# Patient Record
Sex: MALE | Race: WHITE | NOT HISPANIC OR LATINO | Employment: FULL TIME | URBAN - METROPOLITAN AREA
[De-identification: names, ages, dates, MRNs, and addresses within clinical notes are randomized per-mention and may not be internally consistent; named-entity substitution may affect disease eponyms.]

---

## 2018-09-28 ENCOUNTER — OFFICE VISIT (OUTPATIENT)
Dept: URGENT CARE | Facility: CLINIC | Age: 23
End: 2018-09-28
Payer: MEDICAID

## 2018-09-28 VITALS
WEIGHT: 170 LBS | RESPIRATION RATE: 18 BRPM | HEIGHT: 67 IN | OXYGEN SATURATION: 98 % | BODY MASS INDEX: 26.68 KG/M2 | TEMPERATURE: 98 F

## 2018-09-28 DIAGNOSIS — S62.635A CLOSED DISPLACED FRACTURE OF DISTAL PHALANX OF LEFT RING FINGER, INITIAL ENCOUNTER: Primary | ICD-10-CM

## 2018-09-28 DIAGNOSIS — S69.92XA INJURY OF LEFT RING FINGER, INITIAL ENCOUNTER: ICD-10-CM

## 2018-09-28 PROCEDURE — 99203 OFFICE O/P NEW LOW 30 MIN: CPT | Mod: S$GLB,,, | Performed by: PHYSICIAN ASSISTANT

## 2018-09-28 PROCEDURE — 73140 X-RAY EXAM OF FINGER(S): CPT | Mod: FY,LT,S$GLB, | Performed by: RADIOLOGY

## 2018-09-28 NOTE — PATIENT INSTRUCTIONS
Finger Fracture, Closed  You have a broken finger (fracture). This causes local pain, swelling, and bruising. This injury usually takes about 4 to 6 weeks to heal, but can take longer in some cases. Finger injuries are often treated with a splint or cast, or by taping the injured finger to the next one (garrett taping). This protects the injured finger and holds the bone in position while it heals. More serious fractures may need surgery.     If the fingernail has been severely injured, it will probably fall off in 1 to 2 weeks. A new fingernail will usually start to grow back within a month.  Home care  Follow these guidelines when caring for yourself at home:  · Keep your hand elevated to reduce pain and swelling. When sitting or lying down keep your arm above the level of your heart. You can do this by placing your arm on a pillow that rests on your chest or on a pillow at your side. This is most important during the first 2 days (48 hours) after the injury.  · Put an ice pack on the injured area. Do this for 20 minutes every 1 to 2 hours the first day for pain relief. You can make an ice pack by wrapping a plastic bag of ice cubes in a thin towel. As the ice melts, be careful that the cast or splint doesnt get wet. Continue using the ice pack 3 to 4 times a day until the pain and swelling go away.  · Keep the cast or splint completely dry at all times. Bathe with your cast or splint out of the water. Protect it with a large plastic bag, rubber-banded at the top end. If a fiberglass cast or splint gets wet, you can dry it with a hair dryer.  · If garrett tape was put on and it becomes wet or dirty, change it. You may replace it with paper, plastic, or cloth tape. Cloth tape and paper tapes must be kept dry. Keep the garrett tape in place for at least 4 weeks.  · You may use acetaminophen or ibuprofen to control pain, unless another pain medicine was prescribed. If you have chronic liver or kidney disease, talk with  your healthcare provider before using these medicines. Also talk with your provider if youve had a stomach ulcer or gastrointestinal bleeding.  · Dont put creams or objects under the cast if you have itching.  Follow-up care  Follow up with your healthcare provider, or as advised. This is to make sure the bone is healing the way it should.  X-rays may be taken. You will be told of any new findings that may affect your care.  When to seek medical advice  Call your healthcare provider right away if any of these occur:  · The plaster cast or splint becomes wet or soft  · The cast or splint cracks  · The fiberglass cast or splint stays wet for more than 24 hours  · Pain or swelling gets worse  · Redness, warmth, swelling, drainage from the wound, or foul odor from a cast or splint  · Finger becomes more cold, blue, numb, or tingly  · You cant move your finger  · The skin around the cast or splint becomes red  · Fever of 100.4ºF (38ºC) or higher, or as directed by your healthcare provider  Date Last Reviewed: 2/1/2017 © 2000-2017 [employer]. [address] [place] [address]. All rights reserved. This information is not intended as a substitute for professional medical care. Always follow your healthcare professional's instructions.        Mallet Finger  You have an injury to your finger causing the tip of your finger to droop down. This makes your finger look like a small hammer or mallet. This is why its given this name. It is also called baseball finger. This injury happens when the tendon that holds the finger straight at the last joint tears. Sometimes a small break happens where this tendon attaches to the bone.  This causes local pain, swelling, and bruising. This injury usually takes about 4 to 6 weeks to heal. This injury is often treated with a special finger splint called a stack splint. It holds the tendon in the correct position. But even with right treatment, it may not be  possible to fully straighten that joint after the injury heals. After healing, the joint will be stiff but usually recovers flexibility over time.  Home care  · Keep your arm elevated to reduce pain and swelling. When sitting or lying down elevate your arm above the level of your heart. You can do this by placing your arm on a pillow that rests on your chest or on a pillow at your side. This is most important during the first 48 hours after the injury.  · Put an ice pack over the injured area for 15 to 20 minutes every 3 to 6 hours. You should do this for the first 24 to 48 hours. You can make an ice pack by filling a plastic bag that seals at the top with ice cubes and then wrapping it with a thin towel. Be careful not to injure your skin with the ice treatments. Ice should never be applied directly to skin. Continue the use of ice packs for relief of pain and swelling as needed. After 48 hours, apply heat (warm shower or warm bath) for 15 to 20 minutes several times a day, or alternate ice and heat.  · If a splint was applied, keep it in place for the time advised. If you remove it too soon, it will cause the position of the tendon to change and the finger joint will heal in a bent position.  · You may use over-the-counter pain medicine to control pain, unless another pain medicine was prescribed.Talk with your healthcare provider before using these medicines if you have chronic liver or kidney disease or ever had a stomach ulcer or GI bleeding.  · Most patients can return to normal activity while wearing the splint. Discuss any limitations with your healthcare provider.  Follow-up care  Follow up with your healthcare provider, or as advised.  If X-rays were taken, you will be told of any new findings that may affect your care.   When to seek medical advice  Call your healthcare provider right away if any of the following occur:  · Pain or swelling in the injured finger increases  · The finger becomes red or  warm   · Injured finger becomes cold, blue, numb, or tingly  Date Last Reviewed: 11/23/2015  © 6170-3184 "[employer]. [address] [place] [address], [place] [address]. All rights reserved. This information is not intended as a substitute for professional medical care. Always follow your healthcare professional's instructions.      Please follow up with your Primary care provider within 2-5 days if your signs and symptoms have not resolved or worsen.     If your condition worsens or fails to improve we recommend that you receive another evaluation at the emergency room immediately or contact your primary medical clinic to discuss your concerns.   You must understand that you have received an Urgent Care treatment only and that you may be released before all of your medical problems are known or treated. You, the patient, will arrange for follow up care as instructed.     RED FLAGS/WARNING SYMPTOMS DISCUSSED WITH PATIENT THAT WOULD WARRANT EMERGENT MEDICAL ATTENTION. PATIENT VERBALIZED UNDERSTANDING.

## 2018-09-28 NOTE — PROGRESS NOTES
"Subjective:       Patient ID: Byron Garcia is a 23 y.o. male.    Vitals:  height is 5' 7" (1.702 m) and weight is 77.1 kg (170 lb). His temperature is 98.2 °F (36.8 °C). His respiration is 18 and oxygen saturation is 98%.     Chief Complaint: Hand Pain    Hand Pain    The incident occurred 5 to 7 days ago. The incident occurred at school. The injury mechanism was a direct blow. The pain is present in the left fingers. The pain is at a severity of 5/10. The pain is mild. The pain has been constant since the incident. Pertinent negatives include no chest pain or numbness. Nothing aggravates the symptoms.     Review of Systems   Constitution: Negative for weakness and malaise/fatigue.   HENT: Negative for nosebleeds.    Cardiovascular: Negative for chest pain and syncope.   Respiratory: Negative for shortness of breath.    Musculoskeletal: Positive for joint pain and joint swelling. Negative for back pain and neck pain.   Gastrointestinal: Negative for abdominal pain.   Genitourinary: Negative for hematuria.   Neurological: Negative for dizziness and numbness.       Objective:      Physical Exam   Constitutional: He is oriented to person, place, and time. He appears well-developed and well-nourished. He is cooperative.  Non-toxic appearance. He does not appear ill. No distress.   HENT:   Head: Normocephalic and atraumatic.   Right Ear: Hearing, tympanic membrane, external ear and ear canal normal.   Left Ear: Hearing, tympanic membrane, external ear and ear canal normal.   Nose: Nose normal. No mucosal edema, rhinorrhea or nasal deformity. No epistaxis. Right sinus exhibits no maxillary sinus tenderness and no frontal sinus tenderness. Left sinus exhibits no maxillary sinus tenderness and no frontal sinus tenderness.   Mouth/Throat: Uvula is midline, oropharynx is clear and moist and mucous membranes are normal. No trismus in the jaw. Normal dentition. No uvula swelling. No posterior oropharyngeal erythema.   Eyes: " Conjunctivae and lids are normal. Right eye exhibits no discharge. Left eye exhibits no discharge. No scleral icterus.   Sclera clear bilat   Neck: Trachea normal, normal range of motion, full passive range of motion without pain and phonation normal. Neck supple.   Cardiovascular: Normal rate, regular rhythm, normal heart sounds, intact distal pulses and normal pulses.   Pulmonary/Chest: Effort normal and breath sounds normal. No respiratory distress.   Abdominal: Soft. Normal appearance and bowel sounds are normal. He exhibits no distension, no pulsatile midline mass and no mass. There is no tenderness.   Musculoskeletal: He exhibits no edema or deformity.        Left hand: He exhibits decreased range of motion, tenderness, bony tenderness and swelling. He exhibits normal two-point discrimination, normal capillary refill, no deformity and no laceration. Normal sensation noted. Decreased sensation is not present in the ulnar distribution, is not present in the medial redistribution and is not present in the radial distribution. Normal strength noted. He exhibits no finger abduction, no thumb/finger opposition and no wrist extension trouble.        Hands:  Slight decrease in extension DIP of left 4th phalanx   Neurological: He is alert and oriented to person, place, and time. He exhibits normal muscle tone. Coordination normal.   Skin: Skin is warm, dry and intact. He is not diaphoretic. No pallor.   Psychiatric: He has a normal mood and affect. His speech is normal and behavior is normal. Judgment and thought content normal. Cognition and memory are normal.   Nursing note and vitals reviewed.        XRAY: closed avulsion fracture proximal distal phalanx of the 4th phalanx of the left hand    EXTENSION SPLINT PLACED. NVIT DISTALLY WITH SILT AND 2+BCR POST SPLINT PLACEMENT     Assessment:       1. Closed displaced fracture of distal phalanx of left ring finger, initial encounter    2. Injury of left ring finger,  initial encounter        Plan:         Closed displaced fracture of distal phalanx of left ring finger, initial encounter  -     Ambulatory referral to Orthopedics    Injury of left ring finger, initial encounter  -     X-Ray Finger 2 or More Views; Future; Expected date: 09/28/2018          Finger Fracture, Closed  You have a broken finger (fracture). This causes local pain, swelling, and bruising. This injury usually takes about 4 to 6 weeks to heal, but can take longer in some cases. Finger injuries are often treated with a splint or cast, or by taping the injured finger to the next one (garrett taping). This protects the injured finger and holds the bone in position while it heals. More serious fractures may need surgery.     If the fingernail has been severely injured, it will probably fall off in 1 to 2 weeks. A new fingernail will usually start to grow back within a month.  Home care  Follow these guidelines when caring for yourself at home:  · Keep your hand elevated to reduce pain and swelling. When sitting or lying down keep your arm above the level of your heart. You can do this by placing your arm on a pillow that rests on your chest or on a pillow at your side. This is most important during the first 2 days (48 hours) after the injury.  · Put an ice pack on the injured area. Do this for 20 minutes every 1 to 2 hours the first day for pain relief. You can make an ice pack by wrapping a plastic bag of ice cubes in a thin towel. As the ice melts, be careful that the cast or splint doesnt get wet. Continue using the ice pack 3 to 4 times a day until the pain and swelling go away.  · Keep the cast or splint completely dry at all times. Bathe with your cast or splint out of the water. Protect it with a large plastic bag, rubber-banded at the top end. If a fiberglass cast or splint gets wet, you can dry it with a hair dryer.  · If garrett tape was put on and it becomes wet or dirty, change it. You may replace it  with paper, plastic, or cloth tape. Cloth tape and paper tapes must be kept dry. Keep the garrett tape in place for at least 4 weeks.  · You may use acetaminophen or ibuprofen to control pain, unless another pain medicine was prescribed. If you have chronic liver or kidney disease, talk with your healthcare provider before using these medicines. Also talk with your provider if youve had a stomach ulcer or gastrointestinal bleeding.  · Dont put creams or objects under the cast if you have itching.  Follow-up care  Follow up with your healthcare provider, or as advised. This is to make sure the bone is healing the way it should.  X-rays may be taken. You will be told of any new findings that may affect your care.  When to seek medical advice  Call your healthcare provider right away if any of these occur:  · The plaster cast or splint becomes wet or soft  · The cast or splint cracks  · The fiberglass cast or splint stays wet for more than 24 hours  · Pain or swelling gets worse  · Redness, warmth, swelling, drainage from the wound, or foul odor from a cast or splint  · Finger becomes more cold, blue, numb, or tingly  · You cant move your finger  · The skin around the cast or splint becomes red  · Fever of 100.4ºF (38ºC) or higher, or as directed by your healthcare provider  Date Last Reviewed: 2/1/2017 © 2000-2017 [employer]. [address] [place] [address]. All rights reserved. This information is not intended as a substitute for professional medical care. Always follow your healthcare professional's instructions.        Mallet Finger  You have an injury to your finger causing the tip of your finger to droop down. This makes your finger look like a small hammer or mallet. This is why its given this name. It is also called baseball finger. This injury happens when the tendon that holds the finger straight at the last joint tears. Sometimes a small break happens where this tendon attaches to  the bone.  This causes local pain, swelling, and bruising. This injury usually takes about 4 to 6 weeks to heal. This injury is often treated with a special finger splint called a stack splint. It holds the tendon in the correct position. But even with right treatment, it may not be possible to fully straighten that joint after the injury heals. After healing, the joint will be stiff but usually recovers flexibility over time.  Home care  · Keep your arm elevated to reduce pain and swelling. When sitting or lying down elevate your arm above the level of your heart. You can do this by placing your arm on a pillow that rests on your chest or on a pillow at your side. This is most important during the first 48 hours after the injury.  · Put an ice pack over the injured area for 15 to 20 minutes every 3 to 6 hours. You should do this for the first 24 to 48 hours. You can make an ice pack by filling a plastic bag that seals at the top with ice cubes and then wrapping it with a thin towel. Be careful not to injure your skin with the ice treatments. Ice should never be applied directly to skin. Continue the use of ice packs for relief of pain and swelling as needed. After 48 hours, apply heat (warm shower or warm bath) for 15 to 20 minutes several times a day, or alternate ice and heat.  · If a splint was applied, keep it in place for the time advised. If you remove it too soon, it will cause the position of the tendon to change and the finger joint will heal in a bent position.  · You may use over-the-counter pain medicine to control pain, unless another pain medicine was prescribed.Talk with your healthcare provider before using these medicines if you have chronic liver or kidney disease or ever had a stomach ulcer or GI bleeding.  · Most patients can return to normal activity while wearing the splint. Discuss any limitations with your healthcare provider.  Follow-up care  Follow up with your healthcare provider, or as  advised.  If X-rays were taken, you will be told of any new findings that may affect your care.   When to seek medical advice  Call your healthcare provider right away if any of the following occur:  · Pain or swelling in the injured finger increases  · The finger becomes red or warm   · Injured finger becomes cold, blue, numb, or tingly  Date Last Reviewed: 11/23/2015  © 6366-4014 [employer]. [address] [place] [address], [place] [address]. All rights reserved. This information is not intended as a substitute for professional medical care. Always follow your healthcare professional's instructions.      Please follow up with your Primary care provider within 2-5 days if your signs and symptoms have not resolved or worsen.     If your condition worsens or fails to improve we recommend that you receive another evaluation at the emergency room immediately or contact your primary medical clinic to discuss your concerns.   You must understand that you have received an Urgent Care treatment only and that you may be released before all of your medical problems are known or treated. You, the patient, will arrange for follow up care as instructed.     RED FLAGS/WARNING SYMPTOMS DISCUSSED WITH PATIENT THAT WOULD WARRANT EMERGENT MEDICAL ATTENTION. PATIENT VERBALIZED UNDERSTANDING.

## 2022-06-14 ENCOUNTER — OFFICE VISIT (OUTPATIENT)
Dept: INTERNAL MEDICINE | Facility: CLINIC | Age: 27
End: 2022-06-14
Payer: COMMERCIAL

## 2022-06-14 ENCOUNTER — HOSPITAL ENCOUNTER (OUTPATIENT)
Dept: RADIOLOGY | Facility: OTHER | Age: 27
Discharge: HOME | End: 2022-06-14
Attending: STUDENT IN AN ORGANIZED HEALTH CARE EDUCATION/TRAINING PROGRAM
Payer: COMMERCIAL

## 2022-06-14 VITALS
SYSTOLIC BLOOD PRESSURE: 128 MMHG | WEIGHT: 173.63 LBS | BODY MASS INDEX: 27.19 KG/M2 | HEART RATE: 82 BPM | DIASTOLIC BLOOD PRESSURE: 77 MMHG | OXYGEN SATURATION: 100 %

## 2022-06-14 DIAGNOSIS — S93.402A SPRAIN OF LEFT ANKLE, UNSPECIFIED LIGAMENT, INITIAL ENCOUNTER: Primary | ICD-10-CM

## 2022-06-14 DIAGNOSIS — S93.402A SPRAIN OF LEFT ANKLE, UNSPECIFIED LIGAMENT, INITIAL ENCOUNTER: ICD-10-CM

## 2022-06-14 PROCEDURE — 99203 PR OFFICE/OUTPT VISIT, NEW, LEVL III, 30-44 MIN: ICD-10-PCS | Mod: S$GLB,,, | Performed by: STUDENT IN AN ORGANIZED HEALTH CARE EDUCATION/TRAINING PROGRAM

## 2022-06-14 PROCEDURE — 73610 X-RAY EXAM OF ANKLE: CPT | Mod: 26,LT,, | Performed by: RADIOLOGY

## 2022-06-14 PROCEDURE — 99999 PR PBB SHADOW E&M-NEW PATIENT-LVL III: CPT | Mod: PBBFAC,,, | Performed by: STUDENT IN AN ORGANIZED HEALTH CARE EDUCATION/TRAINING PROGRAM

## 2022-06-14 PROCEDURE — 73610 XR ANKLE COMPLETE 3 VIEW LEFT: ICD-10-PCS | Mod: 26,LT,, | Performed by: RADIOLOGY

## 2022-06-14 PROCEDURE — 99203 OFFICE O/P NEW LOW 30 MIN: CPT | Mod: PBBFAC | Performed by: STUDENT IN AN ORGANIZED HEALTH CARE EDUCATION/TRAINING PROGRAM

## 2022-06-14 PROCEDURE — 99203 OFFICE O/P NEW LOW 30 MIN: CPT | Mod: S$GLB,,, | Performed by: STUDENT IN AN ORGANIZED HEALTH CARE EDUCATION/TRAINING PROGRAM

## 2022-06-14 PROCEDURE — 99999 PR PBB SHADOW E&M-NEW PATIENT-LVL III: ICD-10-PCS | Mod: PBBFAC,,, | Performed by: STUDENT IN AN ORGANIZED HEALTH CARE EDUCATION/TRAINING PROGRAM

## 2022-06-14 PROCEDURE — 73610 X-RAY EXAM OF ANKLE: CPT | Mod: TC,FY,LT

## 2022-06-14 RX ORDER — IBUPROFEN 600 MG/1
600 TABLET ORAL EVERY 6 HOURS PRN
Qty: 30 TABLET | Refills: 0 | Status: SHIPPED | OUTPATIENT
Start: 2022-06-14 | End: 2022-06-28

## 2022-06-14 NOTE — PROGRESS NOTES
Subjective:       Patient ID: Byron Garcia is a 27 y.o. male.    Chief Complaint: Sprain of left ankle, unspecified ligament, initial encounter [O38.217A]    Patient is new to me, here to establish care.    Ankle injury -   Injured left ankle while playing basketball   Stepped on another player's foot and rolled ankle last Tuesday  Continued playing, painful but still able to bear weight  Mild swelling and bruising initially   Got an ankle brace yesterday, improvement in pain when wearing  Has been icing intermittently   History of plantar fascitis in that foot, no other prior injuries     Review of Systems   Musculoskeletal: Positive for arthralgias, gait problem, joint swelling and myalgias.         No current outpatient medications  Objective:      Vitals:    06/14/22 0946   BP: 128/77   Pulse: 82   SpO2: 100%   Weight: 78.8 kg (173 lb 9.8 oz)   PainSc:   6     Body mass index is 27.19 kg/m².    Physical Exam  Vitals and nursing note reviewed.   Constitutional:       General: He is not in acute distress.     Appearance: He is not ill-appearing or diaphoretic.   HENT:      Head: Normocephalic and atraumatic.   Eyes:      Conjunctiva/sclera: Conjunctivae normal.   Cardiovascular:      Rate and Rhythm: Normal rate and regular rhythm.      Heart sounds: Normal heart sounds. No murmur heard.    No friction rub. No gallop.   Pulmonary:      Effort: Pulmonary effort is normal. No respiratory distress.      Breath sounds: Normal breath sounds. No stridor. No wheezing, rhonchi or rales.   Musculoskeletal:      Left ankle: Swelling (trace edema anterior lateral malleolus) present. No deformity or ecchymosis. Tenderness present over the ATF ligament. No lateral malleolus, medial malleolus, CF ligament, posterior TF ligament, base of 5th metatarsal or proximal fibula tenderness. Normal range of motion. Normal pulse.      Left Achilles Tendon: Normal. No defects.      Left foot: Normal range of motion. No swelling, deformity,  tenderness or bony tenderness. Normal pulse.      Comments:   Dorsiflexion left 5/5 no pain  Plantarflexion left 5/5 no pain  Pain with left ankle inversion, mild pain with eversion        Skin:     General: Skin is warm and dry.      Comments: Color WNL   Neurological:      Mental Status: He is alert. Mental status is at baseline.      Motor: No weakness.      Gait: Gait abnormal (antalgic).   Psychiatric:         Mood and Affect: Mood normal.         Behavior: Behavior normal.         Assessment:       1. Sprain of left ankle, unspecified ligament, initial encounter        Plan:       Sprain of left ankle, unspecified ligament, initial encounter  Xray today  Lace up ankle brace for next week while weight bearing, avoid strenuous activity   Elevated and ice while resting  Ibuprofen PRN swelling and pain  Start ankle stretching and strengthening exercises, handout provided  Avoid activities and movements that worsen pain  If unimproved in 1 week RTC or call office   -     X-Ray Ankle Complete 3 View Left; Future  -     ibuprofen (ADVIL,MOTRIN) 600 MG tablet; Take 1 tablet (600 mg total) by mouth every 6 (six) hours as needed for Pain.      Juliette Jasmine MD  6/14/2022

## 2022-09-27 ENCOUNTER — OFFICE VISIT (OUTPATIENT)
Dept: URGENT CARE | Facility: CLINIC | Age: 27
End: 2022-09-27
Payer: COMMERCIAL

## 2022-09-27 VITALS
RESPIRATION RATE: 18 BRPM | WEIGHT: 173.75 LBS | TEMPERATURE: 98 F | OXYGEN SATURATION: 98 % | SYSTOLIC BLOOD PRESSURE: 138 MMHG | HEART RATE: 81 BPM | BODY MASS INDEX: 27.27 KG/M2 | DIASTOLIC BLOOD PRESSURE: 73 MMHG | HEIGHT: 67 IN

## 2022-09-27 DIAGNOSIS — M79.645 PAIN OF LEFT THUMB: Primary | ICD-10-CM

## 2022-09-27 DIAGNOSIS — S63.642A SPRAIN OF METACARPOPHALANGEAL (MCP) JOINT OF LEFT THUMB, INITIAL ENCOUNTER: ICD-10-CM

## 2022-09-27 PROCEDURE — 99213 OFFICE O/P EST LOW 20 MIN: CPT | Mod: S$GLB,,, | Performed by: PHYSICIAN ASSISTANT

## 2022-09-27 PROCEDURE — 3075F SYST BP GE 130 - 139MM HG: CPT | Mod: CPTII,S$GLB,, | Performed by: PHYSICIAN ASSISTANT

## 2022-09-27 PROCEDURE — 3078F DIAST BP <80 MM HG: CPT | Mod: CPTII,S$GLB,, | Performed by: PHYSICIAN ASSISTANT

## 2022-09-27 PROCEDURE — 3075F PR MOST RECENT SYSTOLIC BLOOD PRESS GE 130-139MM HG: ICD-10-PCS | Mod: CPTII,S$GLB,, | Performed by: PHYSICIAN ASSISTANT

## 2022-09-27 PROCEDURE — 3078F PR MOST RECENT DIASTOLIC BLOOD PRESSURE < 80 MM HG: ICD-10-PCS | Mod: CPTII,S$GLB,, | Performed by: PHYSICIAN ASSISTANT

## 2022-09-27 PROCEDURE — 73140 X-RAY EXAM OF FINGER(S): CPT | Mod: S$GLB,,, | Performed by: RADIOLOGY

## 2022-09-27 PROCEDURE — 1159F PR MEDICATION LIST DOCUMENTED IN MEDICAL RECORD: ICD-10-PCS | Mod: CPTII,S$GLB,, | Performed by: PHYSICIAN ASSISTANT

## 2022-09-27 PROCEDURE — 99213 PR OFFICE/OUTPT VISIT, EST, LEVL III, 20-29 MIN: ICD-10-PCS | Mod: S$GLB,,, | Performed by: PHYSICIAN ASSISTANT

## 2022-09-27 PROCEDURE — 73140 XR FINGER 2 OR MORE VIEWS: ICD-10-PCS | Mod: S$GLB,,, | Performed by: RADIOLOGY

## 2022-09-27 PROCEDURE — 1159F MED LIST DOCD IN RCRD: CPT | Mod: CPTII,S$GLB,, | Performed by: PHYSICIAN ASSISTANT

## 2022-09-27 PROCEDURE — 3008F PR BODY MASS INDEX (BMI) DOCUMENTED: ICD-10-PCS | Mod: CPTII,S$GLB,, | Performed by: PHYSICIAN ASSISTANT

## 2022-09-27 PROCEDURE — 1160F RVW MEDS BY RX/DR IN RCRD: CPT | Mod: CPTII,S$GLB,, | Performed by: PHYSICIAN ASSISTANT

## 2022-09-27 PROCEDURE — 3008F BODY MASS INDEX DOCD: CPT | Mod: CPTII,S$GLB,, | Performed by: PHYSICIAN ASSISTANT

## 2022-09-27 PROCEDURE — 1160F PR REVIEW ALL MEDS BY PRESCRIBER/CLIN PHARMACIST DOCUMENTED: ICD-10-PCS | Mod: CPTII,S$GLB,, | Performed by: PHYSICIAN ASSISTANT

## 2022-09-27 NOTE — PATIENT INSTRUCTIONS
- Rest.    - Drink plenty of fluids.    - Acetaminophen (tylenol) or Ibuprofen (advil,motrin) as directed as needed for fever/pain. Avoid tylenol if you have a history of liver disease. Do not take ibuprofen if you have a history of GI bleeding, kidney disease, or if you take blood thinners.     - Follow up with your PCP or specialty clinic as directed in the next 1-2 weeks if not improved or as needed.  You can call [phone] to schedule an appointment with the appropriate provider.    - Go to the ER or seek medical attention immediately if you develop new or worsening symptoms.     - You must understand that you have received an Urgent Care treatment only and that you may be released before all of your medical problems are known or treated.   - You, the patient, will arrange for follow up care as instructed.   - If your condition worsens or fails to improve we recommend that you receive another evaluation at the ER immediately or contact your PCP to discuss your concerns or return here.

## 2022-09-27 NOTE — PROGRESS NOTES
"Subjective:       Patient ID: Byron Garcia is a 27 y.o. male.    Vitals:  height is 5' 7" (1.702 m) and weight is 78.8 kg (173 lb 11.6 oz). His temporal temperature is 98.1 °F (36.7 °C). His blood pressure is 138/73 and his pulse is 81. His respiration is 18 and oxygen saturation is 98%.     Chief Complaint: Pain (Left hand thumb)    Patient reports to clinic with the left thumb pain after injury. Pt states that this occurred 10 days ago while playing football, the ball hit his thumb and he has has pain since then. There was initially bruising and swelling, which has improved. Pain localized about 1st MCP.  Patient stated that he has iced the thumb and wrapped it along with taking Ibuprofen, with mild relief.     Pain  This is a new problem. The current episode started in the past 7 days (last Saturday). The problem occurs constantly. The problem has been unchanged. Associated symptoms include arthralgias, joint swelling and weakness. Pertinent negatives include no abdominal pain, chest pain, chills, coughing, diaphoresis, fatigue, fever, nausea, neck pain, numbness, rash, sore throat or vomiting. The symptoms are aggravated by bending. He has tried NSAIDs for the symptoms. The treatment provided no relief.     Constitution: Negative for chills, sweating, fatigue and fever.   HENT:  Negative for sinus pain and sore throat.    Neck: Negative for neck pain and neck stiffness.   Cardiovascular:  Negative for chest pain, leg swelling and palpitations.   Eyes:  Negative for eye discharge, eye itching and eye pain.   Respiratory:  Negative for cough and sputum production.    Gastrointestinal:  Negative for abdominal pain, nausea, vomiting and diarrhea.   Genitourinary:  Negative for dysuria, frequency, urgency and hematuria.   Musculoskeletal:  Positive for pain, trauma, joint pain, joint swelling and abnormal ROM of joint.   Skin:  Positive for bruising. Negative for rash.   Neurological:  Negative for numbness, " tingling and seizures.     Objective:      Physical Exam   Constitutional: He is oriented to person, place, and time. He appears well-developed.  Non-toxic appearance. He does not appear ill. No distress.   HENT:   Head: Normocephalic and atraumatic.   Ears:   Right Ear: External ear normal.   Left Ear: External ear normal.   Eyes: Conjunctivae are normal. Right eye exhibits no discharge. Left eye exhibits no discharge. No scleral icterus.   Pulmonary/Chest: Effort normal. No stridor. No respiratory distress. He has no wheezes.   Musculoskeletal:        Hands:    Neurological: He is alert and oriented to person, place, and time.   Skin: Skin is not diaphoretic.   Psychiatric: His behavior is normal. Judgment and thought content normal.   Nursing note and vitals reviewed.        XR FINGER 2 OR MORE VIEWS    Result Date: 9/27/2022  EXAMINATION: XR FINGER 2 OR MORE VIEWS CLINICAL HISTORY: trauma left thumb- left pain,s welling, bruising, ttp left thumb about mcp;  Pain in left finger(s) COMPARISON: Left hand 09/28/2018 FINDINGS: Three views left thumb. No acute displaced fracture or dislocation of the thumb.  No radiopaque foreign body.  No significant edema.     1. No acute displaced fracture or dislocation of the thumb. Electronically signed by: Bernabe Cristobal MD Date:    09/27/2022 Time:    17:01     There is what initially appeared to be small linear lucency of lateral proximal aspect of proximal 1st phalanx only visible on one view, no cortical step off, no displacement or abnormality to joint, could be vasculature, if it is nondisplaced small fracture, would not be surgical. More likely sprain. Pt in thumb spica, instructed CHANTE, if pain persistent another 1-2 weeks follow up hand.   - Discussed ddx, home care, tx options, and given follow up precautions.     Assessment:       1. Pain of left thumb    2. Sprain of metacarpophalangeal (MCP) joint of left thumb, initial encounter          Plan:         Pain of  left thumb  -     XR FINGER 2 OR MORE VIEWS; Future; Expected date: 09/27/2022  -     Ambulatory referral/consult to Hand Surgery    Sprain of metacarpophalangeal (MCP) joint of left thumb, initial encounter       Patient Instructions   - Rest.    - Drink plenty of fluids.    - Acetaminophen (tylenol) or Ibuprofen (advil,motrin) as directed as needed for fever/pain. Avoid tylenol if you have a history of liver disease. Do not take ibuprofen if you have a history of GI bleeding, kidney disease, or if you take blood thinners.     - Follow up with your PCP or specialty clinic as directed in the next 1-2 weeks if not improved or as needed.  You can call [phone] to schedule an appointment with the appropriate provider.    - Go to the ER or seek medical attention immediately if you develop new or worsening symptoms.     - You must understand that you have received an Urgent Care treatment only and that you may be released before all of your medical problems are known or treated.   - You, the patient, will arrange for follow up care as instructed.   - If your condition worsens or fails to improve we recommend that you receive another evaluation at the ER immediately or contact your PCP to discuss your concerns or return here.

## 2023-09-18 ENCOUNTER — NURSE TRIAGE (OUTPATIENT)
Dept: ADMINISTRATIVE | Facility: CLINIC | Age: 28
End: 2023-09-18
Payer: COMMERCIAL

## 2023-09-18 ENCOUNTER — OFFICE VISIT (OUTPATIENT)
Dept: URGENT CARE | Facility: CLINIC | Age: 28
End: 2023-09-18
Payer: COMMERCIAL

## 2023-09-18 VITALS
OXYGEN SATURATION: 100 % | TEMPERATURE: 98 F | SYSTOLIC BLOOD PRESSURE: 131 MMHG | HEIGHT: 67 IN | BODY MASS INDEX: 27.15 KG/M2 | RESPIRATION RATE: 16 BRPM | HEART RATE: 60 BPM | DIASTOLIC BLOOD PRESSURE: 71 MMHG | WEIGHT: 173 LBS

## 2023-09-18 DIAGNOSIS — R68.84 MANDIBLE PAIN: ICD-10-CM

## 2023-09-18 DIAGNOSIS — J02.9 SORE THROAT: Primary | ICD-10-CM

## 2023-09-18 LAB
CTP QC/QA: YES
SARS-COV-2 AG RESP QL IA.RAPID: NEGATIVE

## 2023-09-18 PROCEDURE — 99214 OFFICE O/P EST MOD 30 MIN: CPT | Mod: S$GLB,,, | Performed by: FAMILY MEDICINE

## 2023-09-18 PROCEDURE — 87811 SARS CORONAVIRUS 2 ANTIGEN POCT, MANUAL READ: ICD-10-PCS | Mod: QW,S$GLB,, | Performed by: FAMILY MEDICINE

## 2023-09-18 PROCEDURE — 87811 SARS-COV-2 COVID19 W/OPTIC: CPT | Mod: QW,S$GLB,, | Performed by: FAMILY MEDICINE

## 2023-09-18 PROCEDURE — 99214 PR OFFICE/OUTPT VISIT, EST, LEVL IV, 30-39 MIN: ICD-10-PCS | Mod: S$GLB,,, | Performed by: FAMILY MEDICINE

## 2023-09-18 NOTE — TELEPHONE ENCOUNTER
"Patient states he was playing a game of flag football on Saturday morning (9/16/23) and was elbowed in the space between his left jaw and ear. Patient states he fell to the ground but was not knocked unconscious. Patient states he has a know noted on his left jaw and chewing is compromised and left jaw is painful to touch. Patient states left jaw is swollen but believes swelling is resolving at this time. Patient states he feels "foggy" and not like himself.    Patient advised to Visit an Urgent Care Center today for evaluation/treatment. Patient states understanding of care advice and cites no additional concerns at this time.       Reason for Disposition   Large swelling or bruise (> 2 inches or 5 cm)    Additional Information   Negative: Major bleeding (actively dripping or spurting) that can't be stopped   Negative: Bullet, knife or other serious penetrating wound   Negative: Difficulty breathing or choking   Negative: Knocked out (unconscious) > 1 minute   Negative: Difficult to awaken or acting confused (e.g., disoriented, slurred speech)   Negative: Severe neck pain   Negative: Sounds like a life-threatening emergency to the triager   Negative: Head or forehead injury is main concern   Negative: Neck injury is main concern   Negative: Eye or orbit injury is main concern   Negative: Ear injury is main concern   Negative: Mouth injury is main concern   Negative: Nose injury is main concern   Negative: Teeth or tooth injury is main concern   Negative: Wound looks infected   Negative: Bleeding won't stop after 10 minutes of direct pressure (using correct technique)   Negative: Skin is split open or gaping (or length > 1/4 inch or 6 mm)   Negative: Crooked face or smile   Negative: Looks like a broken bone (e.g., cheekbone is flat on one side)   Negative: Can't fully open or close the mouth   Negative: Neck pain   Negative: Injury caused by high speed (e.g., MVA), great height (e.g., fall > 10 feet or 3 meters), " or severe blow from hard object (e.g., golf club or baseball bat)   Negative: Knocked out (unconscious) < 1 minute and now fine   Negative: Closing the mouth and biting down does not feel normal   Negative: Double vision or unable to look upward   Negative: Numbness of the face (i.e., claims loss of sensation in part of face)   Negative: Taking Coumadin (warfarin) or other strong blood thinner, or known bleeding disorder (e.g., thrombocytopenia)   Negative: Sounds like a serious injury to the triager    Protocols used: Face Injury-A-[place]

## 2023-09-18 NOTE — PROGRESS NOTES
"Subjective:      Patient ID: Byron Garcia is a 28 y.o. male.    Vitals:  height is 5' 7" (1.702 m) and weight is 78.5 kg (173 lb). His oral temperature is 98.3 °F (36.8 °C). His blood pressure is 131/71 and his pulse is 60. His respiration is 16 and oxygen saturation is 100%.     Chief Complaint: Facial Injury    Pt also complains of it being painful to eat to to the pain around ear/jaw.  Patient presents with left lateral facial pain at the TMJ.  He states he was playing flag football 2 days ago when he he was struck by another player's body part believes and elbow to the area and afterwards he has been experiencing mild swelling and moderate pain especially when eating.  He does feel like his teeth are well aligned.  He states the pain was worse yesterday slightly improved today he soft foods yesterday.  He does endorse slight headache sore throat and fogginess today. Pt is concerned of possible concussion.  He denies any loss of balance or LOC.  No gait disturbance.  No change in vision or hearing.  Patient not on any blood thinners.  No vomiting.  No nausea.    Facial Injury       HENT:  Positive for facial trauma.       Objective:     Physical Exam  Constitutional: Pt oriented to person, place, and time.  Non-toxic appearance.   Patient does not appear ill. No distress. normal  HENT: No icterus or facial swelling appreciated  Head: Normocephalic   There is mild TTP overlying the left TMJ, minimal soft tissue swelling isolated at that area, no clicking or popping or deviation of the jaw with opening and closing of the mouth  Ears: left EAC and TM without debris or hemotympanum respectively, neg battles sign, neg racoon sign  Nose: No congestion.   Pulmonary/Chest: Effort normal. No stridor. No respiratory distress.   Abdominal: Normal appearance. Abdomen exhibits no distension.   Musculoskeletal:         General: No swelling.   Neurological: no focal deficit. Patient is alert and oriented to person, place, " and time.   Skin: Skin is not diaphoretic and not pale. no jaundice  Psychiatric: Patients behavior is normal. Mood, judgment and thought content normal.     Assessment:     1. Sore throat    2. Mandible pain        Plan:       Sore throat  -     SARS Coronavirus 2 Antigen, POCT Manual Read- neg  Rest and hydration encouraged    Mandible pain  -     X-Ray Mandible Less Than 4 Views; Future; Expected date: 09/18/2023-- pt advised to complete xray tomorrow if pain persisting to r/o fracture,      Pt advised: Rest (both physical and mental!) for few days.   Tylenol as needed for pain    Can continue ice at the site of pain     Can alternate with heat therapy in a day or two if tolerating.     If pain worsening or not improving, recommend xray of the mandible/ TMJ and     Please seek medical attention in nearest Emergency Department if experiencing any worsening or worrisome symptoms such as disorientation or difficulty walking or severe headache or change in hearing or vision or vomiting      Your COVID test was negative.  If you are having COVID like or flu-like symptoms I recommend retesting in 24-48 hours.

## 2023-09-18 NOTE — PATIENT INSTRUCTIONS
Rest (both physical and mental!) for few days.   Tylenol as needed for pain    Can continue ice at the site of pain     Can alternate with heat therapy in a day or two if tolerating.     If pain worsening or not improving, recommend xray of the mandible/ TMJ and     Please seek medical attention in nearest Emergency Department if experiencing any worsening or worrisome symptoms such as disorientation or difficulty walking or severe headache or change in hearing or vision or vomiting      Your COVID test was negative.  If you are having COVID like or flu-like symptoms I recommend retesting in 24-48 hours.

## 2023-09-18 NOTE — PROGRESS NOTES
"Subjective:      Patient ID: Byron Garcia is a 28 y.o. male.    Vitals:  height is 5' 7" (1.702 m) and weight is 78.5 kg (173 lb). His respiration is 16.     Chief Complaint: Facial Injury    Pt presents today for facial pain. Pt states he was playing flag football on Saturday when he took an elbow to the left side of his face. Pt complaints bruising, swelling, and fatigue. Pt is concerned of an concussion. Denies loss of consciousness, blood loss, or headache.     Facial Injury   The incident occurred 2 days ago. The injury mechanism was a direct blow. There was no loss of consciousness. There was no blood loss. The quality of the pain is described as pulsating, throbbing and stabbing. The pain is at a severity of 7/10. The pain is moderate. The pain has been fluctuating since the injury.     HENT:  Positive for facial trauma.     Objective:     Physical Exam    Assessment:     No diagnosis found.    Plan:       There are no diagnoses linked to this encounter.                  "

## 2025-06-27 ENCOUNTER — OFFICE VISIT (OUTPATIENT)
Dept: URGENT CARE | Facility: CLINIC | Age: 30
End: 2025-06-27
Payer: COMMERCIAL

## 2025-06-27 VITALS
TEMPERATURE: 98 F | SYSTOLIC BLOOD PRESSURE: 121 MMHG | BODY MASS INDEX: 28.25 KG/M2 | HEIGHT: 67 IN | OXYGEN SATURATION: 98 % | HEART RATE: 68 BPM | RESPIRATION RATE: 20 BRPM | WEIGHT: 180 LBS | DIASTOLIC BLOOD PRESSURE: 71 MMHG

## 2025-06-27 DIAGNOSIS — M79.671 RIGHT FOOT PAIN: Primary | ICD-10-CM

## 2025-06-27 PROCEDURE — 73630 X-RAY EXAM OF FOOT: CPT | Mod: RT,S$GLB,, | Performed by: RADIOLOGY

## 2025-06-27 RX ORDER — NAPROXEN 500 MG/1
500 TABLET ORAL 2 TIMES DAILY PRN
Qty: 20 TABLET | Refills: 0 | Status: SHIPPED | OUTPATIENT
Start: 2025-06-27

## 2025-06-27 RX ORDER — KETOROLAC TROMETHAMINE 30 MG/ML
30 INJECTION, SOLUTION INTRAMUSCULAR; INTRAVENOUS
Status: COMPLETED | OUTPATIENT
Start: 2025-06-27 | End: 2025-06-27

## 2025-06-27 RX ADMIN — KETOROLAC TROMETHAMINE 30 MG: 30 INJECTION, SOLUTION INTRAMUSCULAR; INTRAVENOUS at 04:06

## 2025-06-27 NOTE — PATIENT INSTRUCTIONS
- You must understand that you have received an Urgent Care treatment only and that you may be released before all of your medical problems are known or treated.   - You, the patient, will arrange for follow up care as instructed.   - If your condition worsens or fails to improve we recommend that you receive another evaluation at the ER immediately or contact your PCP to discuss your concerns.   - You can call [phone] or [phone] to help schedule an appointment with the appropriate provider.    Take OTC ibuprofen 400-800 mg 3 times per day. Max dose 3200 mg from all sources per day. Or Tylenol 500-1000 mg 3 times per day. Max 4000 mg from all sources per day.   Rest as much as possible  Alternate heat and ice. Apply heat for 20-30 minutes, perform gentle range of motion exercises, and then apply ice for 15 minutes. Do this 3-4 times per day.    If you received an injection of toradol in the clinic today, DO NOT take any anti-inflammatory medications today. These include: Advil/Motrin (ibuprofen), Aleve (naproxen), Mobic (meloxicam).

## 2025-06-27 NOTE — PROGRESS NOTES
"Subjective:      Patient ID: Byron Garcia is a 30 y.o. male.    Vitals:  height is 5' 7" (1.702 m) and weight is 81.6 kg (180 lb). His oral temperature is 98.2 °F (36.8 °C). His blood pressure is 121/71 and his pulse is 68. His respiration is 20 and oxygen saturation is 98%.     Chief Complaint: Foot Pain    Patient is a 30 y.o. male who presents today with a chief complaint of R foot pain that started over a month ago. Patient reports playing basketball a lot. No direct trauma.  No OTC medications for treatment.    Foot Pain  This is a new problem. The current episode started more than 1 month ago. The problem occurs constantly. The problem has been gradually worsening. Associated symptoms include joint swelling. Pertinent negatives include no abdominal pain, anorexia, arthralgias, change in bowel habit, chest pain, chills, congestion, coughing, diaphoresis, fatigue, fever, headaches, myalgias, nausea, neck pain, numbness, rash, sore throat, swollen glands, urinary symptoms, vertigo, visual change, vomiting or weakness. The symptoms are aggravated by walking and standing. He has tried nothing for the symptoms.     Constitution: Negative for chills, sweating, fatigue and fever.   HENT:  Negative for congestion and sore throat.    Neck: Negative for neck pain.   Cardiovascular:  Negative for chest pain.   Respiratory:  Negative for cough.    Gastrointestinal:  Negative for abdominal pain, nausea and vomiting.   Musculoskeletal:  Positive for joint swelling. Negative for joint pain and muscle ache.   Skin:  Negative for rash.   Neurological:  Negative for history of vertigo, headaches and numbness.      Objective:     Physical Exam   Constitutional: He is oriented to person, place, and time.   HENT:   Head: Normocephalic.   Ears:   Right Ear: External ear normal.   Left Ear: External ear normal.   Nose: Nose normal.   Mouth/Throat: Mucous membranes are moist.   Eyes: Conjunctivae are normal.   Cardiovascular: " Normal rate.   Pulmonary/Chest: Effort normal.   Musculoskeletal: Normal range of motion.         General: Normal range of motion.        Feet:    Neurological: He is alert and oriented to person, place, and time.   Skin: Skin is dry.   Psychiatric: His behavior is normal.   Nursing note and vitals reviewed.    XR FOOT COMPLETE 3 VIEW RIGHT  Result Date: 6/27/2025  EXAMINATION: XR FOOT COMPLETE 3 VIEW RIGHT CLINICAL HISTORY: . Pain in right foot TECHNIQUE: AP, lateral, and oblique views of the right foot were performed. COMPARISON: None FINDINGS: No acute fracture or dislocation. Alignment is normal. The Lisfranc articulation is congruent. Joint spaces are preserved.     No acute osseous abnormality of the foot. Electronically signed by: Danie Goddard Date:    06/27/2025 Time:    17:07      Assessment:     1. Right foot pain        Plan:       Right foot pain  -     XR FOOT COMPLETE 3 VIEW RIGHT; Future; Expected date: 06/27/2025  -     ketorolac injection 30 mg  -     naproxen (NAPROSYN) 500 MG tablet; Take 1 tablet (500 mg total) by mouth 2 (two) times daily as needed (pain).  Dispense: 20 tablet; Refill: 0  -     Ambulatory referral/consult to Podiatry      Patient Instructions   - You must understand that you have received an Urgent Care treatment only and that you may be released before all of your medical problems are known or treated.   - You, the patient, will arrange for follow up care as instructed.   - If your condition worsens or fails to improve we recommend that you receive another evaluation at the ER immediately or contact your PCP to discuss your concerns.   - You can call [phone] or [phone] to help schedule an appointment with the appropriate provider.    Take OTC ibuprofen 400-800 mg 3 times per day. Max dose 3200 mg from all sources per day. Or Tylenol 500-1000 mg 3 times per day. Max 4000 mg from all sources per day.   Rest as much as possible  Alternate heat and ice. Apply heat  for 20-30 minutes, perform gentle range of motion exercises, and then apply ice for 15 minutes. Do this 3-4 times per day.    If you received an injection of toradol in the clinic today, DO NOT take any anti-inflammatory medications today. These include: Advil/Motrin (ibuprofen), Aleve (naproxen), Mobic (meloxicam).